# Patient Record
Sex: FEMALE | Race: WHITE | NOT HISPANIC OR LATINO | Employment: UNEMPLOYED | URBAN - METROPOLITAN AREA
[De-identification: names, ages, dates, MRNs, and addresses within clinical notes are randomized per-mention and may not be internally consistent; named-entity substitution may affect disease eponyms.]

---

## 2019-07-29 ENCOUNTER — OFFICE VISIT CONVERTED (OUTPATIENT)
Dept: ORTHOPEDIC SURGERY | Facility: CLINIC | Age: 54
End: 2019-07-29
Attending: ORTHOPAEDIC SURGERY

## 2021-05-15 VITALS — OXYGEN SATURATION: 95 % | HEIGHT: 67 IN | HEART RATE: 78 BPM | BODY MASS INDEX: 28.47 KG/M2 | WEIGHT: 181.37 LBS

## 2023-09-19 ENCOUNTER — TRANSCRIBE ORDERS (OUTPATIENT)
Dept: ADMINISTRATIVE | Facility: HOSPITAL | Age: 58
End: 2023-09-19
Payer: MEDICARE

## 2023-09-19 DIAGNOSIS — Z12.31 ENCOUNTER FOR SCREENING MAMMOGRAM FOR MALIGNANT NEOPLASM OF BREAST: Primary | ICD-10-CM

## 2023-11-28 ENCOUNTER — HOSPITAL ENCOUNTER (EMERGENCY)
Facility: HOSPITAL | Age: 58
Discharge: HOME | End: 2023-11-28
Attending: EMERGENCY MEDICINE | Admitting: EMERGENCY MEDICINE
Payer: MEDICARE

## 2023-11-28 VITALS
SYSTOLIC BLOOD PRESSURE: 170 MMHG | HEART RATE: 80 BPM | TEMPERATURE: 97.7 F | DIASTOLIC BLOOD PRESSURE: 100 MMHG | BODY MASS INDEX: 28.41 KG/M2 | OXYGEN SATURATION: 100 % | HEIGHT: 67 IN | RESPIRATION RATE: 18 BRPM

## 2023-11-28 DIAGNOSIS — K08.89 PAIN, DENTAL: Primary | ICD-10-CM

## 2023-11-28 DIAGNOSIS — K04.7 DENTAL ABSCESS: ICD-10-CM

## 2023-11-28 PROCEDURE — 99283 EMERGENCY DEPT VISIT LOW MDM: CPT

## 2023-11-28 RX ORDER — OXYCODONE HYDROCHLORIDE AND ACETAMINOPHEN 5; 325 MG/1; MG/1
1 TABLET ORAL ONCE
Status: COMPLETED | OUTPATIENT
Start: 2023-11-28 | End: 2023-11-28

## 2023-11-28 RX ORDER — AMOXICILLIN 875 MG/1
875 TABLET, COATED ORAL 2 TIMES DAILY
Qty: 14 TABLET | Refills: 0 | Status: SHIPPED | OUTPATIENT
Start: 2023-11-28 | End: 2023-12-05

## 2023-11-28 RX ORDER — OXYCODONE HYDROCHLORIDE AND ACETAMINOPHEN 5; 325 MG/1; MG/1
1 TABLET ORAL EVERY 6 HOURS PRN
Qty: 12 TABLET | Refills: 0 | Status: SHIPPED | OUTPATIENT
Start: 2023-11-28

## 2023-11-28 RX ORDER — AMOXICILLIN 875 MG/1
875 TABLET, COATED ORAL ONCE
Status: COMPLETED | OUTPATIENT
Start: 2023-11-28 | End: 2023-11-28

## 2023-11-28 RX ADMIN — OXYCODONE AND ACETAMINOPHEN 1 TABLET: 5; 325 TABLET ORAL at 05:50

## 2023-11-28 RX ADMIN — AMOXICILLIN 875 MG: 875 TABLET, FILM COATED ORAL at 05:50

## 2023-11-28 NOTE — DISCHARGE INSTRUCTIONS
Rest, drink plenty of fluids.  Take your meds as prescribed.  Complete the antibiotics.  You may also take over-the-counter Motrin with this medication as needed for pain.  Follow-up with your [facility] dental for further evaluation and treatment or you may follow-up with [facility] dental school if you choose.  Return to the emergency department for any acutely developing facial redness, increased facial swelling, any airway difficulties or any new or worse concerns.

## 2023-11-28 NOTE — ED PROVIDER NOTES
Time: 5:25 AM EST  Date of encounter:  11/28/2023  Independent Historian/Clinical History and Information was obtained by:   Patient    History is limited by: N/A    Chief Complaint: DENTAL PAIN/SWELLING      History of Present Illness:      The patient presents to the emergency department complaining of right lower dental pain and swelling that she states started about 4 days ago.  She states that she went to the dentist at [facility] about 1 month ago and had her upper teeth pulled and a denture made.  She states that she was supposed to go back for her lower extractions and denture made states that she developed swelling 4 days ago to her right lower jaw.  She states that she called them and that they called in some erythromycin she has been taking it ever since.  She states that she has had swelling that is persisted.  She denies any fevers.  She is able to open and close her mouth without difficulties.      History provided by:  Patient   used: No        Patient Care Team  Primary Care Provider: Provider, No Known    Past Medical History:     Allergies   Allergen Reactions    Penicillins Unknown - Low Severity     Past Medical History:   Diagnosis Date    Asthma      Past Surgical History:   Procedure Laterality Date    HYSTERECTOMY       History reviewed. No pertinent family history.    Home Medications:  Prior to Admission medications    Not on File        Social History:   Social History     Tobacco Use    Smoking status: Every Day     Packs/day: .5     Types: Cigarettes    Smokeless tobacco: Never   Vaping Use    Vaping Use: Never used   Substance Use Topics    Alcohol use: Yes     Comment: socially    Drug use: Never         Review of Systems:  Review of Systems   Constitutional:  Negative for chills and fever.   HENT:  Positive for dental problem and facial swelling. Negative for congestion, ear pain and sore throat.    Eyes:  Negative for pain.   Respiratory:  Negative for  "cough, chest tightness and shortness of breath.    Cardiovascular:  Negative for chest pain.   Gastrointestinal:  Negative for abdominal pain, diarrhea, nausea and vomiting.   Genitourinary:  Negative for flank pain and hematuria.   Musculoskeletal:  Negative for back pain, joint swelling, neck pain and neck stiffness.   Skin:  Negative for color change, pallor and rash.   Neurological:  Negative for seizures and headaches.   All other systems reviewed and are negative.       Physical Exam:  /100 (BP Location: Left arm, Patient Position: Sitting)   Pulse 80   Temp 97.7 °F (36.5 °C) (Oral)   Resp 18   Ht 170.2 cm (67\")   LMP  (LMP Unknown)   SpO2 100%   BMI 28.41 kg/m²     Physical Exam  Vitals and nursing note reviewed.   Constitutional:       General: She is not in acute distress.     Appearance: Normal appearance. She is not ill-appearing or toxic-appearing.   HENT:      Head: Normocephalic and atraumatic.      Mouth/Throat:      Mouth: Mucous membranes are moist.      Pharynx: Oropharynx is clear.   Eyes:      General: No scleral icterus.     Conjunctiva/sclera: Conjunctivae normal.      Pupils: Pupils are equal, round, and reactive to light.   Cardiovascular:      Rate and Rhythm: Normal rate and regular rhythm.      Pulses: Normal pulses.   Pulmonary:      Effort: Pulmonary effort is normal. No respiratory distress.   Musculoskeletal:         General: Normal range of motion.      Cervical back: Normal range of motion and neck supple. No rigidity or tenderness.   Lymphadenopathy:      Cervical: No cervical adenopathy.   Skin:     General: Skin is warm and dry.      Capillary Refill: Capillary refill takes less than 2 seconds.      Findings: No rash.   Neurological:      General: No focal deficit present.      Mental Status: She is alert and oriented to person, place, and time. Mental status is at baseline.   Psychiatric:         Mood and Affect: Mood normal.         Behavior: Behavior normal.      "           Procedures:  Procedures      Medical Decision Making:      Comorbidities that affect care:    Asthma    External Notes reviewed:    None      The following orders were placed and all results were independently analyzed by me:  No orders of the defined types were placed in this encounter.      Medications Given in the Emergency Department:  Medications   oxyCODONE-acetaminophen (PERCOCET) 5-325 MG per tablet 1 tablet (1 tablet Oral Given 11/28/23 0550)   amoxicillin (AMOXIL) tablet 875 mg (875 mg Oral Given 11/28/23 0550)        ED Course:         Labs:    Lab Results (last 24 hours)       ** No results found for the last 24 hours. **             Imaging:    No Radiology Exams Resulted Within Past 24 Hours      Differential Diagnosis and Discussion:    Dental Pain: Differential diagnosis includes but is not limited to dental caries, periodontitis, pericoronitis, peridental abscess, gingival abscess, apthous stomatitis, allergic stomatitis, acute necrotizing ulcerative gingivitis, herpetic stomatitis.  Facial Pain/Swelling: Differential diagnosis includes but is not limited to temporal arteritis, intracranial tumors, neuralgias, dental disease, ocular disease, TMJ syndrome, salivary gland disorders, sinusitis, cluster headaches, migraines, and psychogenic.      MDM  Number of Diagnoses or Management Options  Dental abscess: new and requires workup  Pain, dental: new and requires workup  Risk of Complications, Morbidity, and/or Mortality  Presenting problems: low  Diagnostic procedures: low  Management options: low    Patient Progress  Patient progress: stable           Patient Care Considerations:    LABS: I considered ordering labs, however in the absence of any fever and the patient stable condition I did not feel was warranted.      Consultants/Shared Management Plan:    None    Social Determinants of Health:    Patient is independent, reliable, and has access to care.       Disposition and Care  Coordination:    Discharged: The patient is suitable and stable for discharge with no need for consideration of observation or admission.    I have explained the patient´s condition, diagnoses and treatment plan based on the information available to me at this time. I have answered questions and addressed any concerns. The patient has a good  understanding of the patient´s diagnosis, condition, and treatment plan as can be expected at this point. The vital signs have been stable. The patient´s condition is stable and appropriate for discharge from the emergency department.      The patient will pursue further outpatient evaluation with the primary care physician or other designated or consulting physician as outlined in the discharge instructions. They are agreeable to this plan of care and follow-up instructions have been explained in detail. The patient has received these instructions in written format and have expressed an understanding of the discharge instructions. The patient is aware that any significant change in condition or worsening of symptoms should prompt an immediate return to this or the closest emergency department or call to 911.  I have explained discharge medications and the need for follow up with the patient/caretakers. This was also printed in the discharge instructions. Patient was discharged with the following medications and follow up:      Medication List        New Prescriptions      amoxicillin 875 MG tablet  Commonly known as: AMOXIL  Take 1 tablet by mouth 2 (Two) Times a Day for 7 days.     oxyCODONE-acetaminophen 5-325 MG per tablet  Commonly known as: PERCOCET  Take 1 tablet by mouth Every 6 (Six) Hours As Needed for Moderate Pain for up to 12 doses.               Where to Get Your Medications        These medications were sent to [id number] - [place], [place] - [address] AT [facility] [address] - [phone] [facility] [phone] FX  [address],  [place] [address]      Phone: [phone]   amoxicillin 875 MG tablet  oxyCODONE-acetaminophen 5-325 MG per tablet      [facility]  [address] [place] [address]  [phone]  Call   FOR FOLLOW UP       Final diagnoses:   Pain, dental   Dental abscess        ED Disposition       ED Disposition   Discharge    Condition   Stable    Comment   --               This medical record created using voice recognition software.             Diana Solano, JEFFY  11/28/23 0614

## 2024-10-07 ENCOUNTER — TELEPHONE (OUTPATIENT)
Dept: FAMILY MEDICINE CLINIC | Facility: CLINIC | Age: 59
End: 2024-10-07

## 2024-10-07 ENCOUNTER — OFFICE VISIT (OUTPATIENT)
Dept: FAMILY MEDICINE CLINIC | Facility: CLINIC | Age: 59
End: 2024-10-07
Payer: MEDICARE

## 2024-10-07 VITALS
OXYGEN SATURATION: 100 % | DIASTOLIC BLOOD PRESSURE: 72 MMHG | HEIGHT: 67 IN | BODY MASS INDEX: 25.8 KG/M2 | SYSTOLIC BLOOD PRESSURE: 141 MMHG | HEART RATE: 67 BPM | WEIGHT: 164.4 LBS

## 2024-10-07 DIAGNOSIS — M79.642 BILATERAL HAND PAIN: Primary | ICD-10-CM

## 2024-10-07 DIAGNOSIS — Z13.220 SCREENING CHOLESTEROL LEVEL: ICD-10-CM

## 2024-10-07 DIAGNOSIS — Z00.00 ENCOUNTER FOR MEDICAL EXAMINATION TO ESTABLISH CARE: ICD-10-CM

## 2024-10-07 DIAGNOSIS — Z11.59 ENCOUNTER FOR HEPATITIS C SCREENING TEST FOR LOW RISK PATIENT: ICD-10-CM

## 2024-10-07 DIAGNOSIS — M79.641 BILATERAL HAND PAIN: Primary | ICD-10-CM

## 2024-10-07 RX ORDER — MELOXICAM 7.5 MG/1
7.5 TABLET ORAL DAILY
Qty: 30 TABLET | Refills: 0 | Status: SHIPPED | OUTPATIENT
Start: 2024-10-07

## 2024-10-07 NOTE — TELEPHONE ENCOUNTER
Hub staff attempted to follow warm transfer process and was unsuccessful     Caller: DULCE WRIGHT    Relationship to patient: Emergency Contact    Best call back number: [phone]     Patient is needing: CALLER STATED: RETURNING CALL AND REQUESTING A CALL BACK
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
## 2024-11-06 ENCOUNTER — LAB (OUTPATIENT)
Dept: LAB | Facility: HOSPITAL | Age: 59
End: 2024-11-06
Payer: MEDICARE

## 2024-11-06 ENCOUNTER — TELEPHONE (OUTPATIENT)
Dept: FAMILY MEDICINE CLINIC | Facility: CLINIC | Age: 59
End: 2024-11-06
Payer: MEDICARE

## 2024-11-06 DIAGNOSIS — M79.642 BILATERAL HAND PAIN: ICD-10-CM

## 2024-11-06 DIAGNOSIS — Z13.220 SCREENING CHOLESTEROL LEVEL: ICD-10-CM

## 2024-11-06 DIAGNOSIS — Z11.59 ENCOUNTER FOR HEPATITIS C SCREENING TEST FOR LOW RISK PATIENT: ICD-10-CM

## 2024-11-06 DIAGNOSIS — M79.641 BILATERAL HAND PAIN: ICD-10-CM

## 2024-11-06 LAB
ALBUMIN SERPL-MCNC: 4.1 G/DL (ref 3.5–5.2)
ALBUMIN/GLOB SERPL: 1.7 G/DL
ALP SERPL-CCNC: 47 U/L (ref 39–117)
ALT SERPL W P-5'-P-CCNC: 20 U/L (ref 1–33)
ANION GAP SERPL CALCULATED.3IONS-SCNC: 7 MMOL/L (ref 5–15)
AST SERPL-CCNC: 24 U/L (ref 1–32)
BILIRUB SERPL-MCNC: <0.2 MG/DL (ref 0–1.2)
BUN SERPL-MCNC: 12 MG/DL (ref 8–23)
BUN/CREAT SERPL: 11.4 (ref 7–25)
CALCIUM SPEC-SCNC: 9.5 MG/DL (ref 8.6–10.5)
CHLORIDE SERPL-SCNC: 105 MMOL/L (ref 98–107)
CHOLEST SERPL-MCNC: 178 MG/DL (ref 0–200)
CHROMATIN AB SERPL-ACNC: <10 IU/ML (ref 0–14)
CO2 SERPL-SCNC: 31 MMOL/L (ref 22–29)
CREAT SERPL-MCNC: 1.05 MG/DL (ref 0.57–1)
CRP SERPL-MCNC: <0.3 MG/DL (ref 0–0.5)
DEPRECATED RDW RBC AUTO: 38.1 FL (ref 37–54)
EGFRCR SERPLBLD CKD-EPI 2021: 61 ML/MIN/1.73
ERYTHROCYTE [DISTWIDTH] IN BLOOD BY AUTOMATED COUNT: 12.6 % (ref 12.3–15.4)
ERYTHROCYTE [SEDIMENTATION RATE] IN BLOOD: 3 MM/HR (ref 0–30)
GLOBULIN UR ELPH-MCNC: 2.4 GM/DL
GLUCOSE SERPL-MCNC: 83 MG/DL (ref 65–99)
HCT VFR BLD AUTO: 39 % (ref 34–46.6)
HCV AB SER QL: NORMAL
HDLC SERPL-MCNC: 32 MG/DL (ref 40–60)
HGB BLD-MCNC: 12.3 G/DL (ref 12–15.9)
LDLC SERPL CALC-MCNC: 117 MG/DL (ref 0–100)
LDLC/HDLC SERPL: 3.55 {RATIO}
MCH RBC QN AUTO: 26.6 PG (ref 26.6–33)
MCHC RBC AUTO-ENTMCNC: 31.5 G/DL (ref 31.5–35.7)
MCV RBC AUTO: 84.2 FL (ref 79–97)
PLATELET # BLD AUTO: 250 10*3/MM3 (ref 140–450)
PMV BLD AUTO: 13.3 FL (ref 6–12)
POTASSIUM SERPL-SCNC: 4.4 MMOL/L (ref 3.5–5.2)
PROT SERPL-MCNC: 6.5 G/DL (ref 6–8.5)
RBC # BLD AUTO: 4.63 10*6/MM3 (ref 3.77–5.28)
SODIUM SERPL-SCNC: 143 MMOL/L (ref 136–145)
TRIGL SERPL-MCNC: 162 MG/DL (ref 0–150)
VLDLC SERPL-MCNC: 29 MG/DL (ref 5–40)
WBC NRBC COR # BLD AUTO: 6.25 10*3/MM3 (ref 3.4–10.8)

## 2024-11-06 PROCEDURE — 80053 COMPREHEN METABOLIC PANEL: CPT

## 2024-11-06 PROCEDURE — 86431 RHEUMATOID FACTOR QUANT: CPT

## 2024-11-06 PROCEDURE — 80061 LIPID PANEL: CPT

## 2024-11-06 PROCEDURE — 36415 COLL VENOUS BLD VENIPUNCTURE: CPT

## 2024-11-06 PROCEDURE — 86803 HEPATITIS C AB TEST: CPT

## 2024-11-06 PROCEDURE — 85027 COMPLETE CBC AUTOMATED: CPT

## 2024-11-06 PROCEDURE — 85652 RBC SED RATE AUTOMATED: CPT

## 2024-11-06 PROCEDURE — 86140 C-REACTIVE PROTEIN: CPT

## 2024-11-06 NOTE — TELEPHONE ENCOUNTER
Caller: DULCE WRIGHT    Relationship: Emergency Contact    Best call back number: [phone]     What is the best time to reach you: ANY    Who are you requesting to speak with (clinical staff, provider,  specific staff member): CLINICAL STAFF    What was the call regarding: PATIENTS  CALLED STATING THAT THEY LOST THE PAPER WITH THE XRAY INFORMATION AND WOULD LIKE TO KNOW WHO THEY NEED TO CALL TO HAVE IT DONE

## 2024-11-07 ENCOUNTER — OFFICE VISIT (OUTPATIENT)
Dept: FAMILY MEDICINE CLINIC | Facility: CLINIC | Age: 59
End: 2024-11-07
Payer: MEDICARE

## 2024-11-07 VITALS
BODY MASS INDEX: 25.96 KG/M2 | HEART RATE: 80 BPM | DIASTOLIC BLOOD PRESSURE: 89 MMHG | SYSTOLIC BLOOD PRESSURE: 140 MMHG | OXYGEN SATURATION: 96 % | WEIGHT: 165.4 LBS | HEIGHT: 67 IN

## 2024-11-07 DIAGNOSIS — Z87.891 PERSONAL HISTORY OF NICOTINE DEPENDENCE: ICD-10-CM

## 2024-11-07 DIAGNOSIS — Z12.31 ENCOUNTER FOR SCREENING MAMMOGRAM FOR MALIGNANT NEOPLASM OF BREAST: Primary | ICD-10-CM

## 2024-11-07 DIAGNOSIS — G89.29 CHRONIC PAIN OF RIGHT ANKLE: ICD-10-CM

## 2024-11-07 DIAGNOSIS — Z12.11 COLON CANCER SCREENING: ICD-10-CM

## 2024-11-07 DIAGNOSIS — R79.89 ELEVATED SERUM CREATININE: Primary | ICD-10-CM

## 2024-11-07 DIAGNOSIS — M25.571 CHRONIC PAIN OF RIGHT ANKLE: ICD-10-CM

## 2024-11-07 PROCEDURE — 99213 OFFICE O/P EST LOW 20 MIN: CPT | Performed by: STUDENT IN AN ORGANIZED HEALTH CARE EDUCATION/TRAINING PROGRAM

## 2024-11-07 NOTE — TELEPHONE ENCOUNTER
Spoke with patient and informed her of [facility] address and hours of operation. Informed her that she can walk in for XRAY - no appt needed.

## 2024-11-07 NOTE — PROGRESS NOTES
I have reviewed results of lab work.    Total cholesterol is normal.  However LDL cholesterol is elevated at 117.  Based on her overall ASCVD risk score, statin therapy would be indicated to reduce risk of heart attack and stroke.    CMP shows elevation of creatinine at 1.05 although GFR is normal at 61.  Will repeat this lab in 3 months to reassess renal function.    CBC shows normal white count, hemoglobin, and platelets.    Rheumatoid factor, CRP and ESR are normal.  Hepatitis C screen is nonreactive.    She has appointment coming up with me today and so we can discuss these labs at that time.    ?  This document has been electronically signed by Binh Llanes MD on November 7, 2024 12:00 EST

## 2024-11-07 NOTE — PROGRESS NOTES
Subjective:       Tami Parsons is a 59 y.o. female with a concurrent medical history of hyperlipidemia who presents to discuss lab work.    Ms. Parsons a hyperlipidemia on lab work.  Recommendation for her based on risk factors would be to start statin medication.  She declines this today and prefers lifestyle dietary modifications alone.    Ms. Parsons does have persistently elevated blood pressure.  Blood pressure elevated at 140/89.  She is reluctant to start medication.  I would recommend following DASH diet and if she fails to improve at 6-month follow-up would start antihypertensive    Ms. Parsons does have elevated creatinine.  Will repeat this lab in 3 months and encouraged increase consumption of fluid.    Ms. Parsons has chronic ankle pain related to an old injury.  She request referral for physical therapy and will send this today.    The following portions of the patient's history were reviewed and updated as appropriate: allergies, current medications, past family history, past medical history, past social history, past surgical history, and problem list.    Past Medical Hx:  Past Medical History:   Diagnosis Date    Asthma        Past Surgical Hx:  Past Surgical History:   Procedure Laterality Date    HYSTERECTOMY         Current Meds:  No current outpatient medications on file.    Allergies:  Allergies   Allergen Reactions    Penicillins Unknown - Low Severity       Family Hx:  History reviewed. No pertinent family history.     Social History:  Social History     Socioeconomic History    Marital status:    Tobacco Use    Smoking status: Every Day     Current packs/day: 0.50     Average packs/day: 0.5 packs/day for 42.8 years (21.4 ttl pk-yrs)     Types: Cigarettes     Start date: 1982    Smokeless tobacco: Never   Vaping Use    Vaping status: Never Used   Substance and Sexual Activity    Alcohol use: Yes     Comment: socially    Drug use: Never    Sexual activity: Defer       Review of  "Systems  Review of Systems   Musculoskeletal:  Positive for arthralgias.       Objective:     /89 (BP Location: Left arm, Patient Position: Sitting, Cuff Size: Large Adult)   Pulse 80   Ht 170.2 cm (67\")   Wt 75 kg (165 lb 6.4 oz)   SpO2 96%   BMI 25.91 kg/m²   Physical Exam  Constitutional:       General: She is not in acute distress.     Appearance: Normal appearance. She is not ill-appearing, toxic-appearing or diaphoretic.   Cardiovascular:      Rate and Rhythm: Normal rate and regular rhythm.   Pulmonary:      Effort: Pulmonary effort is normal.      Breath sounds: Normal breath sounds.   Neurological:      Mental Status: She is alert.          Assessment/Plan:     Diagnoses and all orders for this visit:    1. Encounter for screening mammogram for malignant neoplasm of breast (Primary)  -     Mammo Screening Digital Tomosynthesis Bilateral With CAD; Future    2. Colon cancer screening  -     Cologuard - Stool, Per Rectum; Future    3. Personal history of nicotine dependence  -     CT Chest Low Dose Wo; Future    4. Chronic pain of right ankle    Ms. Parsons has chronic ankle pain related to an old injury.  She request referral for physical therapy and will send this today.    -     Ambulatory Referral to Physical Therapy for Evaluation & Treatment                Follow-up:     Return in about 6 months (around 5/7/2025) for Blood pressure recheck.    Preventative:  Health Maintenance   Topic Date Due    MAMMOGRAM  Never done    COLORECTAL CANCER SCREENING  Never done    ZOSTER VACCINE (1 of 2) Never done    LUNG CANCER SCREENING  Never done    ANNUAL WELLNESS VISIT  Never done    COVID-19 Vaccine (4 - 2024-25 season) 09/01/2024    INFLUENZA VACCINE  03/31/2025 (Originally 8/1/2024)    Pneumococcal Vaccine 0-64 (1 of 2 - PCV) 10/07/2025 (Originally [birth year]/28/1970)    TDAP/TD VACCINES (1 - Tdap) 10/07/2025 (Originally 3/28/1983)    BMI FOLLOWUP  10/07/2025    HEPATITIS C SCREENING  Completed           This " document has been electronically signed by Binh Llanes MD on November 7, 2024 16:35 EST       Parts of this note are electronic transcriptions/translations of spoken language to printed text using the Dragon Dictation system.

## 2024-12-10 ENCOUNTER — HOSPITAL ENCOUNTER (OUTPATIENT)
Dept: GENERAL RADIOLOGY | Facility: HOSPITAL | Age: 59
Discharge: HOME | End: 2024-12-10
Payer: MEDICARE

## 2024-12-10 ENCOUNTER — HOSPITAL ENCOUNTER (OUTPATIENT)
Dept: CT IMAGING | Facility: HOSPITAL | Age: 59
Discharge: HOME | End: 2024-12-10
Payer: MEDICARE

## 2024-12-10 ENCOUNTER — HOSPITAL ENCOUNTER (OUTPATIENT)
Dept: MAMMOGRAPHY | Facility: HOSPITAL | Age: 59
Discharge: HOME | End: 2024-12-10
Payer: MEDICARE

## 2024-12-10 DIAGNOSIS — M79.641 BILATERAL HAND PAIN: ICD-10-CM

## 2024-12-10 DIAGNOSIS — Z12.31 ENCOUNTER FOR SCREENING MAMMOGRAM FOR MALIGNANT NEOPLASM OF BREAST: ICD-10-CM

## 2024-12-10 DIAGNOSIS — M79.642 BILATERAL HAND PAIN: ICD-10-CM

## 2024-12-10 DIAGNOSIS — Z87.891 PERSONAL HISTORY OF NICOTINE DEPENDENCE: ICD-10-CM

## 2024-12-10 PROCEDURE — 73130 X-RAY EXAM OF HAND: CPT

## 2024-12-10 PROCEDURE — 71271 CT THORAX LUNG CANCER SCR C-: CPT

## 2024-12-10 PROCEDURE — 77063 BREAST TOMOSYNTHESIS BI: CPT

## 2024-12-10 PROCEDURE — 77067 SCR MAMMO BI INCL CAD: CPT

## 2024-12-10 NOTE — PROGRESS NOTES
Negative mammogram.    Thank you,    Binh Llanse    ?  This document has been electronically signed by Binh Llanes MD on December 10, 2024 15:46 EST

## 2024-12-12 ENCOUNTER — TELEPHONE (OUTPATIENT)
Dept: FAMILY MEDICINE CLINIC | Facility: CLINIC | Age: 59
End: 2024-12-12
Payer: MEDICARE

## 2024-12-12 DIAGNOSIS — J38.7: ICD-10-CM

## 2024-12-12 DIAGNOSIS — J39.8 TRACHEAL NODULE: Primary | ICD-10-CM

## 2024-12-16 ENCOUNTER — TREATMENT (OUTPATIENT)
Dept: PHYSICAL THERAPY | Facility: CLINIC | Age: 59
End: 2024-12-16
Payer: MEDICARE

## 2024-12-16 DIAGNOSIS — R29.898 WEAKNESS OF FOOT, RIGHT: ICD-10-CM

## 2024-12-16 DIAGNOSIS — Z87.81 HISTORY OF FRACTURE OF RIGHT ANKLE: ICD-10-CM

## 2024-12-16 DIAGNOSIS — M25.571 CHRONIC PAIN OF RIGHT ANKLE: Primary | ICD-10-CM

## 2024-12-16 DIAGNOSIS — G89.29 CHRONIC PAIN OF RIGHT ANKLE: Primary | ICD-10-CM

## 2024-12-16 PROCEDURE — 97161 PT EVAL LOW COMPLEX 20 MIN: CPT

## 2024-12-16 PROCEDURE — 97110 THERAPEUTIC EXERCISES: CPT

## 2024-12-16 PROCEDURE — 97112 NEUROMUSCULAR REEDUCATION: CPT
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
## 2024-12-19 ENCOUNTER — TREATMENT (OUTPATIENT)
Dept: PHYSICAL THERAPY | Facility: CLINIC | Age: 59
End: 2024-12-19
Payer: MEDICARE

## 2024-12-19 DIAGNOSIS — M25.571 CHRONIC PAIN OF RIGHT ANKLE: Primary | ICD-10-CM

## 2024-12-19 DIAGNOSIS — G89.29 CHRONIC PAIN OF RIGHT ANKLE: Primary | ICD-10-CM

## 2024-12-19 DIAGNOSIS — Z87.81 HISTORY OF FRACTURE OF RIGHT ANKLE: ICD-10-CM

## 2024-12-19 DIAGNOSIS — R29.898 WEAKNESS OF FOOT, RIGHT: ICD-10-CM
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
## 2024-12-30 ENCOUNTER — TREATMENT (OUTPATIENT)
Dept: PHYSICAL THERAPY | Facility: CLINIC | Age: 59
End: 2024-12-30
Payer: MEDICARE

## 2024-12-30 DIAGNOSIS — G89.29 CHRONIC PAIN OF RIGHT ANKLE: Primary | ICD-10-CM

## 2024-12-30 DIAGNOSIS — M25.571 CHRONIC PAIN OF RIGHT ANKLE: Primary | ICD-10-CM

## 2024-12-30 DIAGNOSIS — R29.898 WEAKNESS OF FOOT, RIGHT: ICD-10-CM

## 2024-12-30 DIAGNOSIS — Z87.81 HISTORY OF FRACTURE OF RIGHT ANKLE: ICD-10-CM

## 2024-12-30 PROCEDURE — 97140 MANUAL THERAPY 1/> REGIONS: CPT

## 2024-12-30 PROCEDURE — 97110 THERAPEUTIC EXERCISES: CPT

## 2024-12-30 NOTE — PROGRESS NOTES
Physical Therapy Daily Treatment Note                          Patient: Tami Parsons   : 1965  Diagnosis/ICD-10 Code:  Chronic pain of right ankle [M25.571, G89.29]  Referring practitioner: Binh Llanes MD  Date of Initial Visit: Type: THERAPY  Noted: 2024  Today's Date: 2024  Patient seen for 3 sessions           Subjective   The patient reported feeling that she can move her foot side to side better.     Objective   See Exercise, Manual, and Modality Logs for complete treatment.     Assessment/Plan     Patient tolerated today's treatment without complaints of pain. Improvements noted in ankle ROM following manual, able to introduce low level stabilization with good tolerance. Strength, ROM, and increased pain with activity deficits still limits patient's ability to perform safe ambulation. Further skilled care indicated at this time.       Timed:  Manual Therapy:    15     mins  05237;  Therapeutic Exercise:    15     mins  74977;     Neuromuscular Ronnie:   0    mins  69900;    Therapeutic Activity:     0     mins  80802;     Gait Trainin     mins  97132;     Aquatics                         0      mins  71423    Un-timed:  Mechanical Traction      0     mins  57470  Dry Needling     0     mins self-pay  Electrical Stimulation:    0     mins  60415 ( );      Timed Treatment:   30   mins   Total Treatment:     30   mins    Dejuan Muro PT  Physical Therapist    Electronically signed 2024    [place] License: PT - [id number]

## 2025-01-03 ENCOUNTER — TREATMENT (OUTPATIENT)
Dept: PHYSICAL THERAPY | Facility: CLINIC | Age: 60
End: 2025-01-03
Payer: MEDICARE

## 2025-01-03 DIAGNOSIS — Z87.81 HISTORY OF FRACTURE OF RIGHT ANKLE: ICD-10-CM

## 2025-01-03 DIAGNOSIS — R29.898 WEAKNESS OF FOOT, RIGHT: ICD-10-CM

## 2025-01-03 DIAGNOSIS — G89.29 CHRONIC PAIN OF RIGHT ANKLE: Primary | ICD-10-CM

## 2025-01-03 DIAGNOSIS — M25.571 CHRONIC PAIN OF RIGHT ANKLE: Primary | ICD-10-CM

## 2025-01-03 NOTE — PROGRESS NOTES
[facility]                              Physical Therapy Daily Treatment Note    Patient: Tami Parsons   : 1965  Diagnosis/ICD-10 Code:  Chronic pain of right ankle [M25.571, G89.29]  Referring practitioner: Binh Llanes MD  Date of Initial Visit: Type: THERAPY  Noted: 2024  Today's Date: 1/3/2025  Patient seen for 4 sessions           Subjective   Tami Parsons reports: her right ankle is feeling good today. Pt denies any pain at time of arrival.      Objective     See Exercise, Manual, and Modality Logs for complete treatment.     Assessment/Plan  Tami tolerated today's treatment well with no complaints of discomfort or pain. During new stabilization exercises patient reports her ankle felt like it was working, but never hurt.  Patient will continue to benefit from skilled physical therapy services to further address pain management,  their deficits in strength, and range of motion in order to improve upon their functional mobility for any ADL concerns.      Progress per Plan of Care         Timed:  Manual Therapy:         mins  54271;  Therapeutic Exercise:         mins  57541;     Neuromuscular Ronnie:    12    mins  74552;    Therapeutic Activity:     15     mins  36350;     Gait Training:           mins  80729;        Untimed:  Electrical Stimulation:         mins  45482 ( );  Mechanical Traction:         mins  48213;       Timed Treatment:   27   mins   Total Treatment:     27   mins      Electronically signed:     Tiki Wright PTA  Physical Therapist Assistant  [facility] License #: [id number]

## 2025-01-13 ENCOUNTER — TREATMENT (OUTPATIENT)
Dept: PHYSICAL THERAPY | Facility: CLINIC | Age: 60
End: 2025-01-13
Payer: MEDICARE

## 2025-01-13 DIAGNOSIS — G89.29 CHRONIC PAIN OF RIGHT ANKLE: Primary | ICD-10-CM

## 2025-01-13 DIAGNOSIS — Z87.81 HISTORY OF FRACTURE OF RIGHT ANKLE: ICD-10-CM

## 2025-01-13 DIAGNOSIS — M25.571 CHRONIC PAIN OF RIGHT ANKLE: Primary | ICD-10-CM

## 2025-01-13 DIAGNOSIS — R29.898 WEAKNESS OF FOOT, RIGHT: ICD-10-CM

## 2025-01-13 PROCEDURE — 97112 NEUROMUSCULAR REEDUCATION: CPT

## 2025-01-13 PROCEDURE — 97110 THERAPEUTIC EXERCISES: CPT

## 2025-01-13 NOTE — PROGRESS NOTES
[facility]                              Physical Therapy Daily Treatment Note    Patient: Tami Parsons   : 1965  Diagnosis/ICD-10 Code:  Chronic pain of right ankle [M25.571, G89.29]  Referring practitioner: Binh Llanes MD  Date of Initial Visit: Type: THERAPY  Noted: 2024  Today's Date: 2025  Patient seen for 5 sessions           Subjective   Tami Parsons reports: her ankle has been feeling really good. Pt denies any pain at time of arrival.      Objective     See Exercise, Manual, and Modality Logs for complete treatment.     Assessment/Plan  Tami progressing as evident by decreased overall right ankle pain. Pt tolerated exercises well, with no complaints of increased pain or discomfort. Patients ankle mobility has improved since initial eval, patient noticed the improvement herself and reported that to PTA.  Patient will continue to benefit from skilled physical therapy services to further address  their deficits in strength, stability and range of motion in order to improve upon their functional mobility for any ADL concerns.      Progress per Plan of Care         Timed:  Manual Therapy:         mins  65659;  Therapeutic Exercise:    15     mins  34111;     Neuromuscular Ronnie:    10    mins  32594;    Therapeutic Activity:          mins  54118;     Gait Training:           mins  70759;        Untimed:  Electrical Stimulation:         mins  44254 ( );  Mechanical Traction:         mins  12519;       Timed Treatment:   25   mins   Total Treatment:     25   mins      Electronically signed:     Tiki Wright PTA  Physical Therapist Assistant  [facility] License #: [id number]
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
## 2025-01-17 ENCOUNTER — TELEPHONE (OUTPATIENT)
Dept: PULMONOLOGY | Facility: CLINIC | Age: 60
End: 2025-01-17

## 2025-01-17 ENCOUNTER — OFFICE VISIT (OUTPATIENT)
Dept: PULMONOLOGY | Facility: CLINIC | Age: 60
End: 2025-01-17
Payer: MEDICARE

## 2025-01-17 VITALS
WEIGHT: 169.6 LBS | DIASTOLIC BLOOD PRESSURE: 85 MMHG | BODY MASS INDEX: 26.62 KG/M2 | HEIGHT: 67 IN | TEMPERATURE: 97.9 F | OXYGEN SATURATION: 97 % | RESPIRATION RATE: 18 BRPM | SYSTOLIC BLOOD PRESSURE: 137 MMHG | HEART RATE: 78 BPM

## 2025-01-17 DIAGNOSIS — R93.89 ABNORMAL CHEST CT: Primary | ICD-10-CM

## 2025-01-17 DIAGNOSIS — Z72.0 TOBACCO ABUSE: ICD-10-CM

## 2025-01-17 DIAGNOSIS — Z71.6 ENCOUNTER FOR SMOKING CESSATION COUNSELING: ICD-10-CM

## 2025-01-17 PROCEDURE — 1159F MED LIST DOCD IN RCRD: CPT

## 2025-01-17 PROCEDURE — 1160F RVW MEDS BY RX/DR IN RCRD: CPT

## 2025-01-17 PROCEDURE — 99204 OFFICE O/P NEW MOD 45 MIN: CPT

## 2025-01-17 NOTE — TELEPHONE ENCOUNTER
Spoke with Nydia mejía to get patient scheduled for 1/24 arrive at 10:30am. Patient received instruction sheet at check out.

## 2025-01-23 ENCOUNTER — ANESTHESIA EVENT (OUTPATIENT)
Dept: GASTROENTEROLOGY | Facility: HOSPITAL | Age: 60
End: 2025-01-23
Payer: MEDICARE

## 2025-01-23 RX ORDER — SODIUM CHLORIDE, SODIUM LACTATE, POTASSIUM CHLORIDE, CALCIUM CHLORIDE 600; 310; 30; 20 MG/100ML; MG/100ML; MG/100ML; MG/100ML
30 INJECTION, SOLUTION INTRAVENOUS CONTINUOUS
Status: CANCELLED | OUTPATIENT
Start: 2025-01-24 | End: 2025-01-24
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
## 2025-01-24 ENCOUNTER — ANESTHESIA (OUTPATIENT)
Dept: GASTROENTEROLOGY | Facility: HOSPITAL | Age: 60
End: 2025-01-24
Payer: MEDICARE

## 2025-01-24 ENCOUNTER — HOSPITAL ENCOUNTER (OUTPATIENT)
Facility: HOSPITAL | Age: 60
Setting detail: HOSPITAL OUTPATIENT SURGERY
Discharge: HOME | End: 2025-01-24
Attending: STUDENT IN AN ORGANIZED HEALTH CARE EDUCATION/TRAINING PROGRAM | Admitting: STUDENT IN AN ORGANIZED HEALTH CARE EDUCATION/TRAINING PROGRAM
Payer: MEDICARE

## 2025-01-24 VITALS
DIASTOLIC BLOOD PRESSURE: 89 MMHG | TEMPERATURE: 99.1 F | OXYGEN SATURATION: 96 % | RESPIRATION RATE: 18 BRPM | WEIGHT: 163.36 LBS | SYSTOLIC BLOOD PRESSURE: 119 MMHG | HEART RATE: 84 BPM | BODY MASS INDEX: 25.59 KG/M2

## 2025-01-24 DIAGNOSIS — R93.89 ABNORMAL CHEST CT: ICD-10-CM

## 2025-01-24 LAB
ACB CMPLX DNA BAL NAA+NON-PRB-NCNCRNG: NOT DETECTED
BLACTX-M ISLT/SPM QL: ABNORMAL
BLAIMP ISLT/SPM QL: ABNORMAL
BLAKPC ISLT/SPM QL: ABNORMAL
BLAOXA-48-LIKE ISLT/SPM QL: ABNORMAL
BLAVIM ISLT/SPM QL: ABNORMAL
C PNEUM DNA NPH QL NAA+NON-PROBE: NOT DETECTED
CILIATED BAL QL: 73 %
E CLOAC COMP DNA BAL NAA+NON-PRB-NCNCRNG: NOT DETECTED
E COLI DNA BAL NAA+NON-PRB-NCNCRNG: NOT DETECTED
FLUAV SUBTYP SPEC NAA+PROBE: NOT DETECTED
FLUBV RNA ISLT QL NAA+PROBE: NOT DETECTED
GP B STREP DNA BAL NAA+NON-PRB-NCNCRNG: NOT DETECTED
HADV DNA SPEC NAA+PROBE: NOT DETECTED
HAEM INFLU DNA BAL NAA+NON-PRB-NCNCRNG: DETECTED
HCOV RNA LOWER RESP QL NAA+NON-PROBE: NOT DETECTED
HMPV RNA NPH QL NAA+NON-PROBE: NOT DETECTED
HPIV RNA LOWER RESP QL NAA+NON-PROBE: NOT DETECTED
K AEROGENES DNA BAL NAA+NON-PRB-NCNCRNG: NOT DETECTED
K OXYTOCA DNA BAL NAA+NON-PRB-NCNCRNG: NOT DETECTED
K PNEU GRP DNA BAL NAA+NON-PRB-NCNCRNG: NOT DETECTED
L PNEUMO DNA LOWER RESP QL NAA+NON-PROBE: NOT DETECTED
LYMPHOCYTES NFR FLD MANUAL: 3 %
M CATARRHALIS DNA BAL NAA+NON-PRB-NCNCRNG: NOT DETECTED
M PNEUMO IGG SER IA-ACNC: NOT DETECTED
MACROPHAGE FLUID %: 19 %
MECA+MECC ISLT/SPM QL: ABNORMAL
NDM GENE: ABNORMAL
NEUTROPHILS NFR FLD MANUAL: 5 %
P AERUGINOSA DNA BAL NAA+NON-PRB-NCNCRNG: NOT DETECTED
PROTEUS SP DNA BAL NAA+NON-PRB-NCNCRNG: NOT DETECTED
RHINOVIRUS RNA SPEC NAA+PROBE: NOT DETECTED
RSV RNA NPH QL NAA+NON-PROBE: NOT DETECTED
S AUREUS DNA BAL NAA+NON-PRB-NCNCRNG: NOT DETECTED
S MARCESCENS DNA BAL NAA+NON-PRB-NCNCRNG: NOT DETECTED
S PNEUM DNA BAL NAA+NON-PRB-NCNCRNG: NOT DETECTED
S PYO DNA BAL NAA+NON-PRB-NCNCRNG: NOT DETECTED
VISUAL PRESENCE OF BLOOD: NORMAL

## 2025-01-24 PROCEDURE — 87633 RESP VIRUS 12-25 TARGETS: CPT | Performed by: STUDENT IN AN ORGANIZED HEALTH CARE EDUCATION/TRAINING PROGRAM

## 2025-01-24 PROCEDURE — 87116 MYCOBACTERIA CULTURE: CPT | Performed by: STUDENT IN AN ORGANIZED HEALTH CARE EDUCATION/TRAINING PROGRAM

## 2025-01-24 PROCEDURE — 87496 CYTOMEG DNA AMP PROBE: CPT | Performed by: STUDENT IN AN ORGANIZED HEALTH CARE EDUCATION/TRAINING PROGRAM

## 2025-01-24 PROCEDURE — 25010000002 LIDOCAINE PF 2% 2 % SOLUTION: Performed by: NURSE ANESTHETIST, CERTIFIED REGISTERED

## 2025-01-24 PROCEDURE — 87070 CULTURE OTHR SPECIMN AEROBIC: CPT | Performed by: STUDENT IN AN ORGANIZED HEALTH CARE EDUCATION/TRAINING PROGRAM

## 2025-01-24 PROCEDURE — 87071 CULTURE AEROBIC QUANT OTHER: CPT | Performed by: STUDENT IN AN ORGANIZED HEALTH CARE EDUCATION/TRAINING PROGRAM

## 2025-01-24 PROCEDURE — 25010000002 PROPOFOL 10 MG/ML EMULSION: Performed by: NURSE ANESTHETIST, CERTIFIED REGISTERED

## 2025-01-24 PROCEDURE — 87205 SMEAR GRAM STAIN: CPT | Performed by: STUDENT IN AN ORGANIZED HEALTH CARE EDUCATION/TRAINING PROGRAM

## 2025-01-24 PROCEDURE — 87102 FUNGUS ISOLATION CULTURE: CPT | Performed by: STUDENT IN AN ORGANIZED HEALTH CARE EDUCATION/TRAINING PROGRAM

## 2025-01-24 PROCEDURE — 89051 BODY FLUID CELL COUNT: CPT | Performed by: STUDENT IN AN ORGANIZED HEALTH CARE EDUCATION/TRAINING PROGRAM

## 2025-01-24 PROCEDURE — 88305 TISSUE EXAM BY PATHOLOGIST: CPT | Performed by: STUDENT IN AN ORGANIZED HEALTH CARE EDUCATION/TRAINING PROGRAM

## 2025-01-24 PROCEDURE — 87252 VIRUS INOCULATION TISSUE: CPT | Performed by: STUDENT IN AN ORGANIZED HEALTH CARE EDUCATION/TRAINING PROGRAM

## 2025-01-24 PROCEDURE — 88108 CYTOPATH CONCENTRATE TECH: CPT | Performed by: STUDENT IN AN ORGANIZED HEALTH CARE EDUCATION/TRAINING PROGRAM

## 2025-01-24 PROCEDURE — 31625 BRONCHOSCOPY W/BIOPSY(S): CPT | Performed by: STUDENT IN AN ORGANIZED HEALTH CARE EDUCATION/TRAINING PROGRAM

## 2025-01-24 PROCEDURE — 31624 DX BRONCHOSCOPE/LAVAGE: CPT | Performed by: STUDENT IN AN ORGANIZED HEALTH CARE EDUCATION/TRAINING PROGRAM

## 2025-01-24 PROCEDURE — 25810000003 LACTATED RINGERS PER 1000 ML: Performed by: NURSE ANESTHETIST, CERTIFIED REGISTERED

## 2025-01-24 PROCEDURE — 87206 SMEAR FLUORESCENT/ACID STAI: CPT | Performed by: STUDENT IN AN ORGANIZED HEALTH CARE EDUCATION/TRAINING PROGRAM

## 2025-01-24 PROCEDURE — 31645 BRNCHSC W/THER ASPIR 1ST: CPT | Performed by: STUDENT IN AN ORGANIZED HEALTH CARE EDUCATION/TRAINING PROGRAM

## 2025-01-24 PROCEDURE — 25010000002 LIDOCAINE HCL (PF) 4 % SOLUTION: Performed by: STUDENT IN AN ORGANIZED HEALTH CARE EDUCATION/TRAINING PROGRAM

## 2025-01-24 RX ORDER — MAGNESIUM HYDROXIDE 1200 MG/15ML
LIQUID ORAL AS NEEDED
Status: DISCONTINUED | OUTPATIENT
Start: 2025-01-24 | End: 2025-01-24 | Stop reason: HOSPADM

## 2025-01-24 RX ORDER — CEFDINIR 300 MG/1
300 CAPSULE ORAL 2 TIMES DAILY
Qty: 10 CAPSULE | Refills: 0 | Status: SHIPPED | OUTPATIENT
Start: 2025-01-24 | End: 2025-01-29

## 2025-01-24 RX ORDER — SODIUM CHLORIDE, SODIUM LACTATE, POTASSIUM CHLORIDE, CALCIUM CHLORIDE 600; 310; 30; 20 MG/100ML; MG/100ML; MG/100ML; MG/100ML
INJECTION, SOLUTION INTRAVENOUS CONTINUOUS PRN
Status: DISCONTINUED | OUTPATIENT
Start: 2025-01-24 | End: 2025-01-24 | Stop reason: SURG

## 2025-01-24 RX ORDER — LIDOCAINE HYDROCHLORIDE 40 MG/ML
INJECTION, SOLUTION RETROBULBAR AS NEEDED
Status: DISCONTINUED | OUTPATIENT
Start: 2025-01-24 | End: 2025-01-24 | Stop reason: HOSPADM

## 2025-01-24 RX ORDER — PROPOFOL 10 MG/ML
VIAL (ML) INTRAVENOUS AS NEEDED
Status: DISCONTINUED | OUTPATIENT
Start: 2025-01-24 | End: 2025-01-24 | Stop reason: SURG

## 2025-01-24 RX ORDER — LIDOCAINE HYDROCHLORIDE 20 MG/ML
INJECTION, SOLUTION EPIDURAL; INFILTRATION; INTRACAUDAL; PERINEURAL AS NEEDED
Status: DISCONTINUED | OUTPATIENT
Start: 2025-01-24 | End: 2025-01-24 | Stop reason: SURG

## 2025-01-24 RX ADMIN — PROPOFOL 70 MG: 10 INJECTION, EMULSION INTRAVENOUS at 14:09

## 2025-01-24 RX ADMIN — LIDOCAINE HYDROCHLORIDE 100 MG: 20 INJECTION, SOLUTION EPIDURAL; INFILTRATION; INTRACAUDAL; PERINEURAL at 14:10

## 2025-01-24 RX ADMIN — PROPOFOL 200 MCG/KG/MIN: 10 INJECTION, EMULSION INTRAVENOUS at 14:10

## 2025-01-24 RX ADMIN — SODIUM CHLORIDE, POTASSIUM CHLORIDE, SODIUM LACTATE AND CALCIUM CHLORIDE: 600; 310; 30; 20 INJECTION, SOLUTION INTRAVENOUS at 13:48

## 2025-01-24 NOTE — OP NOTE
Bronchoscopy Procedure Note    Procedure:  Bronchoscopy with bronchoalveolar lavage   Airway inspection with airway clearance of secretions.     Pre-Operative Diagnosis: Abnormal CT  Post-Operative Diagnosis: Same    Indication:  Abnormal CT, endotracheal lesion seen on CT    Anesthesia: MAC anesthesia    Procedure Details: Patient was consented for the procedure with all risks and benefits of the procedure explained in detail. Patient was given the opportunity to ask questions and all concerns were answered.    The bronchoscope was inserted into the main airway via the mouth. An anatomical survey was done of the main airways and the subsegmental bronchus. The findings are reported below. A bronchoalveolar lavage was performed using 60 mL aliquots of normal saline instilled into the airways then aspirated back from right upper lobe of lung with 30 mL fluid in return. A few 2-3 endotracheal lesions seen on bronchoscopy.  These appear like appendages of tracheal tissue and appears benign.  Biopsies taken.  Scattered secretions seen throughout lung  Airway clearance of lung performed with suctioning and removal of secretions and mucous plugs.     Findings:  Endotracheal lesion/appendage  Friable mucosa, easy bleeding with suction  Scattered petechiae seen airway bilaterally  Scattered purulent secretions    Estimated Blood Loss: <15 mL    Specimens:  BAL RUL  Bronchial washings tracheobronchial tree  Biopsy of endotracheal appendage/lesion    Complications: None; patient tolerated the procedure well.    Disposition: To home    Patient tolerated the procedure well.    Electronically signed by Annie Eid MD, 1/24/2025, 14:30 EST.

## 2025-01-24 NOTE — ANESTHESIA POSTPROCEDURE EVALUATION
Patient: Tami Parsons    Procedure Summary       Date: 01/24/25 Room / Location: [facility] ENDOSCOPY 3 / [facility]    Anesthesia Start: 1347 Anesthesia Stop: 1429    Procedure: BRONCHOSCOPY WITH BAL AND BIOPSIES (Bronchus) Diagnosis:       Abnormal chest CT      (Abnormal chest CT [R93.89])    Surgeons: Annie Eid MD Provider: Mady Eaton CRNA    Anesthesia Type: general ASA Status: 2            Anesthesia Type: general    Vitals  Vitals Value Taken Time   /89 01/24/25 1445   Temp 37.3 °C (99.1 °F) 01/24/25 1445   Pulse 84 01/24/25 1446   Resp 18 01/24/25 1445   SpO2 96 % 01/24/25 1445   Vitals shown include unfiled device data.        Post Anesthesia Care and Evaluation    Patient location during evaluation: bedside  Patient participation: complete - patient participated  Level of consciousness: awake  Pain score: 0  Pain management: adequate    Airway patency: patent  Anesthetic complications: No anesthetic complications  PONV Status: controlled  Cardiovascular status: acceptable and stable  Respiratory status: acceptable  Hydration status: acceptable

## 2025-01-24 NOTE — INTERVAL H&P NOTE
H&P reviewed. The patient was examined and there are no changes to the H&P.  Risk and benefits explained to patient. Risk including bleeding, infection, pneumothorax, and even death can occur. Patient expressed understanding and would like to proceed.    Electronically signed by Annie Eid MD, 01/24/25, 12:02 PM EST.

## 2025-01-26 LAB
BACTERIA SPEC AEROBE CULT: NORMAL
BACTERIA SPEC RESP CULT: NORMAL
GRAM STN SPEC: NORMAL

## 2025-01-27 ENCOUNTER — TREATMENT (OUTPATIENT)
Dept: PHYSICAL THERAPY | Facility: CLINIC | Age: 60
End: 2025-01-27
Payer: MEDICARE

## 2025-01-27 DIAGNOSIS — Z87.81 HISTORY OF FRACTURE OF RIGHT ANKLE: ICD-10-CM

## 2025-01-27 DIAGNOSIS — M25.571 CHRONIC PAIN OF RIGHT ANKLE: Primary | ICD-10-CM

## 2025-01-27 DIAGNOSIS — G89.29 CHRONIC PAIN OF RIGHT ANKLE: Primary | ICD-10-CM

## 2025-01-27 DIAGNOSIS — R29.898 WEAKNESS OF FOOT, RIGHT: ICD-10-CM

## 2025-01-27 PROCEDURE — 97110 THERAPEUTIC EXERCISES: CPT

## 2025-01-27 PROCEDURE — 97112 NEUROMUSCULAR REEDUCATION: CPT

## 2025-01-27 PROCEDURE — 97164 PT RE-EVAL EST PLAN CARE: CPT

## 2025-01-27 NOTE — PROGRESS NOTES
Progress Note      Patient: Tami Parsons   : 1965  Diagnosis/ICD-10 Code:  Chronic pain of right ankle [M25.571, G89.29]  Referring practitioner: Binh Llanes MD  Date of Initial Visit: Type: THERAPY  Noted: 2024  Today's Date: 2025  Patient seen for 6 sessions      Subjective:   Subjective Questionnaire: LEFS: 52  Clinical Progress: improved  Home Program Compliance: Yes  Treatment has included: therapeutic exercise, neuromuscular re-education, manual therapy, therapeutic activity, and gait training    Subjective   Patient reports overall improvement, she still can have a twinge of pain when he ankle moves out.   Objective          Neurological Testing     Sensation     Ankle/Foot     Right Ankle/Foot   Paresthesia: light touch    Comments   Right light touch: lateral foot.     Active Range of Motion     Right Ankle/Foot   Dorsiflexion (ke): 6 degrees   Plantar flexion: 56 degrees   Inversion: 36 degrees   Eversion: 20 degrees     Joint Play     Right Ankle/Foot  Joints within functional limits are the midfoot and forefoot. Hypomobile in the talocrural joint and subtalar joint.     Strength/Myotome Testing     Right Ankle/Foot   Dorsiflexion: 4-  Plantar flexion: 5  Inversion: 5  Eversion: 4-  Great toe flexion: 5  Great toe extension: 5    Tests     Right Ankle/Foot   Negative for Tinel's sign (tarsal tunnel).     Ambulation     Observational Gait   Walking speed, stride length and right stance time within functional limits.     Functional Assessment     Single Leg Stance   Right: 3 seconds      See Exercise, Manual, and Modality Logs for complete treatment.     Assessment/Plan    Patient has progressed well since beginning skilled care. Significant improvement noted in both strength and ROM allowing for improved capacity to ambulate household distances and stair navigation. They are still limited in ankle stability, strength, decreased balance and by increased pain which limit their ability  to perform prolonged standing, navigate uneven ground, yard work, home care activities, and ADLs. Further skilled care indicated at this time to address remaining deficits.     Progress toward previous goals: Partially Met   1. The patient has limited ROM for the right ankle.   LTG 1: 12 weeks:  In order to allow the patient greater ease with forward, lateral, and diagonal mobility the patient will demonstrate improved ROM of the right ankle as follows:  10 degrees of dorsiflexion, 55 degrees of plantarflexion, 35 degrees of inversion, and 15 degrees of eversion.  STATUS:  progressing     STG 1a: 6 weeks:  The patient will demonstrate improved ROM of the right ankle as follows:  5 degrees of dorsiflexion, 50 degrees of plantarflexion, 30 degrees of inversion, and 10 degrees of eversion.    STATUS:  met    2. The patient has limited strength of the right ankle.   LTG 2: 12 weeks:  In order to provide greater joint stability of the right ankle the patient will demonstrate improved strength of the right ankle as follows:  5/5 dorsiflexion, 5/5 inversion, 5/5 eversion, and 5/5 plantar flexion.    STATUS:  progressing     STG 2a: 6 weeks:  The patient will demonstrate improved strength of the right ankle as follows:  4/5 dorsiflexion, 4/5 inversion, 4/5 eversion, and 4/5 plantar flexion.    STATUS:  progressing      3. The patient has gait dysfunction.   LTG 3: 12 weeks:  The patient will ambulate without assistive device, independently, for community distances with minimal limp to the right lower extremity in order to improve mobility and allow patient to perform activities such as grocery shopping with greater ease.    STATUS:  progressing     STG 3a: The patient will be independent in HEP.    STATUS:met    4. The patient complains of right ankle pain.  LTG 4: 12 weeks:  The patient will report a pain rating of 1/10 or better in order to improve  tolerance to activities of daily living and improve sleep  quality.  STATUS:  progressing    STG 4a: 6 weeks:  The patient will report a pain rating of 4/10 or better.  STATUS:  met    5. Mobility: Walking/Moving Around Functional Limitation     LTG 5: 12 weeks:  The patient will demonstrate improved function by achieving a score of 60 on the Lower Extremity Functional Scale.    STATUS:  progressing     STG 5a: 6 weeks:  The patient will demonstrate improved function by achieving a score of 50 on the Lower Extremity Functional Scale.      STATUS: met   Recommendations: Continue as planned  Timeframe: 1 month  Prognosis to achieve goals: good    PT Signature: Dejuan Muro PT    Electronically signed 2025    [place] License: PT - [id number]       Timed:  Manual Therapy:    0     mins  42990;  Therapeutic Exercise:    10     mins  33554;     Neuromuscular Ronnie:    15    mins  [id number];    Therapeutic Activity:     0     mins  56098;     Gait Trainin     mins  42499;     Aquatics                         0      mins  55242    Un-timed:  Mechanical Traction      0     mins  [id number]  Dry Needling     0     mins self-pay  Electrical Stimulation:    0     mins  28786 ( );  6 minutes re-eval [id number]  Timed Treatment:   25   mins   Total Treatment:     31   mins

## 2025-01-28 LAB
CYTO UR: NORMAL
CYTO UR: NORMAL
LAB AP CASE REPORT: NORMAL
LAB AP CASE REPORT: NORMAL
LAB AP CLINICAL INFORMATION: NORMAL
LAB AP CLINICAL INFORMATION: NORMAL
PATH REPORT.FINAL DX SPEC: NORMAL
PATH REPORT.FINAL DX SPEC: NORMAL
PATH REPORT.GROSS SPEC: NORMAL
PATH REPORT.GROSS SPEC: NORMAL
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
## 2025-01-29 ENCOUNTER — TREATMENT (OUTPATIENT)
Dept: PHYSICAL THERAPY | Facility: CLINIC | Age: 60
End: 2025-01-29
Payer: MEDICARE

## 2025-01-29 DIAGNOSIS — Z87.81 HISTORY OF FRACTURE OF RIGHT ANKLE: ICD-10-CM

## 2025-01-29 DIAGNOSIS — G89.29 CHRONIC PAIN OF RIGHT ANKLE: Primary | ICD-10-CM

## 2025-01-29 DIAGNOSIS — M25.571 CHRONIC PAIN OF RIGHT ANKLE: Primary | ICD-10-CM

## 2025-01-29 DIAGNOSIS — R29.898 WEAKNESS OF FOOT, RIGHT: ICD-10-CM

## 2025-01-29 LAB
CMV DNA SPEC QL NAA+PROBE: NEGATIVE
FUNGUS WND CULT: ABNORMAL
SPECIMEN SOURCE: NORMAL

## 2025-01-29 NOTE — PROGRESS NOTES
Physical Therapy Daily Treatment Note  [facility] [address] [place] [address]    Patient: Tami Parsons   : 1965  Referring practitioner: Binh Llanes MD  Date of Initial Visit: Type: THERAPY  Noted: 2024  Today's Date: 2025  Patient seen for 7 sessions           Subjective  Tami Parsons reports: she feels good. No c/o.    Objective   See Exercise, Manual, and Modality Logs for complete treatment.     Assessment/Plan  Refer to re-evaluation dated 25 for updated POC.    Visit Diagnoses:    ICD-10-CM ICD-9-CM   1. Chronic pain of right ankle  M25.571 719.47    G89.29 338.29   2. Weakness of foot, right  R29.898 734   3. History of fracture of right ankle  Z87.81 V15.51       Progress per Plan of Care and Progress strengthening /stabilization /functional activity       Timed:  Manual Therapy:         mins  73317;  Therapeutic Exercise:    15     mins  03352;     Neuromuscular Ronnie:    14    mins  44590;    Therapeutic Activity:          mins  48601;     Gait Training:           mins  74613;     Ultrasound:          mins  01521;    Electrical Stimulation:         mins  62735 ( );  Aquatics  __   mins   68860    Untimed:  Electrical Stimulation:         mins  97862 ( );  Mechanical Traction:         mins  88996;     Timed Treatment:   29   mins   Total Treatment:     29   mins    Electronically Signed:  Denise Carrion PTA  Physical Therapist Assistant    [place] PTA license [id number]

## 2025-01-31 ENCOUNTER — OFFICE VISIT (OUTPATIENT)
Dept: PULMONOLOGY | Facility: CLINIC | Age: 60
End: 2025-01-31
Payer: MEDICARE

## 2025-01-31 VITALS
RESPIRATION RATE: 18 BRPM | HEART RATE: 80 BPM | WEIGHT: 165.8 LBS | TEMPERATURE: 98.1 F | BODY MASS INDEX: 26.02 KG/M2 | OXYGEN SATURATION: 97 % | SYSTOLIC BLOOD PRESSURE: 134 MMHG | HEIGHT: 67 IN | DIASTOLIC BLOOD PRESSURE: 65 MMHG

## 2025-01-31 DIAGNOSIS — Z87.891 PERSONAL HISTORY OF NICOTINE DEPENDENCE: ICD-10-CM

## 2025-01-31 DIAGNOSIS — R93.89 ABNORMAL CHEST CT: ICD-10-CM

## 2025-01-31 DIAGNOSIS — Z71.6 ENCOUNTER FOR SMOKING CESSATION COUNSELING: ICD-10-CM

## 2025-01-31 DIAGNOSIS — A49.2 HAEMOPHILUS INFLUENZAE INFECTION: ICD-10-CM

## 2025-01-31 DIAGNOSIS — J39.8 TRACHEAL MASS: Primary | ICD-10-CM

## 2025-01-31 DIAGNOSIS — Z72.0 TOBACCO ABUSE: ICD-10-CM

## 2025-01-31 LAB
MYCOBACTERIUM SPEC CULT: NORMAL
NIGHT BLUE STAIN TISS: NORMAL
NIGHT BLUE STAIN TISS: NORMAL

## 2025-01-31 RX ORDER — CEFDINIR 300 MG/1
300 CAPSULE ORAL 2 TIMES DAILY
Qty: 10 CAPSULE | Refills: 0 | Status: SHIPPED | OUTPATIENT
Start: 2025-01-31 | End: 2025-02-05

## 2025-02-03 ENCOUNTER — TELEPHONE (OUTPATIENT)
Dept: PHYSICAL THERAPY | Facility: OTHER | Age: 60
End: 2025-02-03
Payer: MEDICARE

## 2025-02-03 LAB — VIRUS SPEC CULT: NORMAL

## 2025-02-03 NOTE — TELEPHONE ENCOUNTER
Caller: DULCE WRIGHT    Relationship: Emergency Contact    What was the call regarding: PATIENT CANCELLED APPT TODAY DUE TO NOT FEELING WELL

## 2025-02-04 ENCOUNTER — TELEPHONE (OUTPATIENT)
Dept: PULMONOLOGY | Facility: CLINIC | Age: 60
End: 2025-02-04
Payer: MEDICARE

## 2025-02-04 NOTE — TELEPHONE ENCOUNTER
Pharmacy Name:  [id number] - [place], [place] - [address] AT [facility] [address] - [phone]  - [phone] FX       Pharmacy representative phone number: [phone]/    What medication are you calling in regards to: KINA    What question does the pharmacy have: PT STATES THAT SHE IS ALLERGIC TO THIS, PHARMACY NEEDS TO IF SHE IS IN FACT AND DO YOU WANT TO SEND A DIFFERENT ANTIBIOTIC    Who is the provider that prescribed the medication: TARA TINSLEY    Additional notes:

## 2025-02-05 DIAGNOSIS — A49.2 HAEMOPHILUS INFLUENZAE INFECTION: Primary | ICD-10-CM

## 2025-02-05 RX ORDER — AZITHROMYCIN 500 MG/1
500 TABLET, FILM COATED ORAL DAILY
Qty: 7 TABLET | Refills: 0 | Status: SHIPPED | OUTPATIENT
Start: 2025-02-05 | End: 2025-02-12

## 2025-02-05 RX ORDER — FLUCONAZOLE 150 MG/1
150 TABLET ORAL ONCE
Qty: 1 TABLET | Refills: 0 | Status: SHIPPED | OUTPATIENT
Start: 2025-02-05 | End: 2025-02-05

## 2025-02-05 NOTE — TELEPHONE ENCOUNTER
Annie called and stated that they cannot prescribe cefdinir  because of her allergy. Please call Patient and/or Annie back. Thank You   No change

## 2025-02-07 LAB
MYCOBACTERIUM SPEC CULT: NORMAL
NIGHT BLUE STAIN TISS: NORMAL
NIGHT BLUE STAIN TISS: NORMAL

## 2025-02-10 ENCOUNTER — TELEPHONE (OUTPATIENT)
Dept: PULMONOLOGY | Facility: CLINIC | Age: 60
End: 2025-02-10

## 2025-02-10 DIAGNOSIS — R07.81 PLEURITIC PAIN: Primary | ICD-10-CM

## 2025-02-10 NOTE — TELEPHONE ENCOUNTER
Please let patient know that I will order her a chest x-ray to have done at her convenience.  We will follow-up with results, thank you.

## 2025-02-10 NOTE — TELEPHONE ENCOUNTER
Caller: DULCE WRIGHT     Relationship: SPOUSE    Best call back number: [phone] PLEASE LEAVE MESSAGE IF UNABLE TO ANSWER    What is your medical concern? PATIENT HAD A BRONCH DONE ON 1/24 WITH . HOWEVER SHE IS STILL HAVING CONSTANT PAIN IN HER LEFT LUNG ABOUT AN INCH OR SO BELOW HER BREAST AND WOULD LIKE TO SPEAK WITH SOMEONE/BE LOOKED AT.    How long has this issue been going on? IT NEVER WENT AWAY AFTER THE SURGERY.

## 2025-02-12 ENCOUNTER — TREATMENT (OUTPATIENT)
Dept: PHYSICAL THERAPY | Facility: CLINIC | Age: 60
End: 2025-02-12
Payer: MEDICARE

## 2025-02-12 DIAGNOSIS — M25.571 CHRONIC PAIN OF RIGHT ANKLE: Primary | ICD-10-CM

## 2025-02-12 DIAGNOSIS — G89.29 CHRONIC PAIN OF RIGHT ANKLE: Primary | ICD-10-CM

## 2025-02-12 DIAGNOSIS — R29.898 WEAKNESS OF FOOT, RIGHT: ICD-10-CM

## 2025-02-12 NOTE — PROGRESS NOTES
Physical Therapy Daily Treatment Note                          Patient: Tami Parsons   : 1965  Diagnosis/ICD-10 Code:  No primary diagnosis found.  Referring practitioner: Binh Llanes MD  Date of Initial Visit: No linked episodes  Today's Date: 2025  Patient seen for Visit count could not be calculated. Make sure you are using a visit which is associated with an episode. sessions           Subjective   The patient reported increased pain 10/10 with the weather change    Objective   See Exercise, Manual, and Modality Logs for complete treatment.     Assessment/Plan     Focused on pain relief with manual intervention. Advised patient on ice, elevation, NSAID use, and stretching for swelling control. Will continue POC as appropriate.        Timed:  Manual Therapy:    20     mins  89526;  Therapeutic Exercise:    10     mins  18130;     Neuromuscular Ronnie:   0    mins  23943;    Therapeutic Activity:     0     mins  71653;     Gait Trainin     mins  20866;     Aquatics                         0      mins  39019    Un-timed:  Mechanical Traction      0     mins  52452  Dry Needling     0     mins self-pay  Electrical Stimulation:    0     mins  09598 ( );      Timed Treatment:   30   mins   Total Treatment:     30   mins    Dejuan Muro PT  Physical Therapist    Electronically signed 2025    [place] License: PT - [id number]

## 2025-02-14 LAB
MYCOBACTERIUM SPEC CULT: NORMAL
NIGHT BLUE STAIN TISS: NORMAL
NIGHT BLUE STAIN TISS: NORMAL

## 2025-02-19 LAB — FUNGUS WND CULT: ABNORMAL

## 2025-02-21 LAB
MYCOBACTERIUM SPEC CULT: NORMAL
NIGHT BLUE STAIN TISS: NORMAL
NIGHT BLUE STAIN TISS: NORMAL

## 2025-02-28 LAB
MYCOBACTERIUM SPEC CULT: NORMAL
NIGHT BLUE STAIN TISS: NORMAL
NIGHT BLUE STAIN TISS: NORMAL

## 2025-03-04 ENCOUNTER — TREATMENT (OUTPATIENT)
Dept: PHYSICAL THERAPY | Facility: CLINIC | Age: 60
End: 2025-03-04
Payer: MEDICARE

## 2025-03-04 DIAGNOSIS — Z87.81 HISTORY OF FRACTURE OF RIGHT ANKLE: ICD-10-CM

## 2025-03-04 DIAGNOSIS — G89.29 CHRONIC PAIN OF RIGHT ANKLE: Primary | ICD-10-CM

## 2025-03-04 DIAGNOSIS — M25.571 CHRONIC PAIN OF RIGHT ANKLE: Primary | ICD-10-CM

## 2025-03-04 DIAGNOSIS — R29.898 WEAKNESS OF FOOT, RIGHT: ICD-10-CM

## 2025-03-04 NOTE — PROGRESS NOTES
Progress Note/Discharge      Patient: Tami Parsons   : 1965  Diagnosis/ICD-10 Code:  Chronic pain of right ankle [M25.571, G89.29]  Referring practitioner: Binh Llanes MD  Date of Initial Visit: Type: THERAPY  Noted: 2024  Today's Date: 3/4/2025  Patient seen for 9 sessions      Subjective:     Clinical Progress: improved  Home Program Compliance: Yes  Treatment has included: therapeutic exercise, neuromuscular re-education, manual therapy, therapeutic activity, and gait training    Subjective   Patient reports overall improvement, she still struggles with swelling when the weather changes  Objective          Neurological Testing     Sensation     Ankle/Foot     Right Ankle/Foot   Paresthesia: light touch    Comments   Right light touch: lateral foot.     Active Range of Motion     Right Ankle/Foot   Dorsiflexion (ke): 5 degrees   Plantar flexion: 60 degrees   Inversion: 33 degrees   Eversion: 26 degrees     Joint Play     Right Ankle/Foot  Joints within functional limits are the talocrural joint, subtalar joint, midfoot and forefoot.     Strength/Myotome Testing     Right Ankle/Foot   Dorsiflexion: 5  Plantar flexion: 5  Inversion: 5  Eversion: 5  Great toe flexion: 5  Great toe extension: 5    Tests     Right Ankle/Foot   Negative for Tinel's sign (tarsal tunnel).     Ambulation     Observational Gait   Walking speed, stride length and right stance time within functional limits.     Functional Assessment     Single Leg Stance   Right: 15 seconds      See Exercise, Manual, and Modality Logs for complete treatment.     Assessment/Plan    Patient has progressed well since beginning skilled care. She has met a plateau in progress at this time, educated patient on self management strategies with swelling. Discharging patient to home program.     Progress toward previous goals: Partially Met   1. The patient has limited ROM for the right ankle.   LTG 1: 12 weeks:  In order to allow the patient greater  ease with forward, lateral, and diagonal mobility the patient will demonstrate improved ROM of the right ankle as follows:  10 degrees of dorsiflexion, 55 degrees of plantarflexion, 35 degrees of inversion, and 15 degrees of eversion.  STATUS:  partially met     STG 1a: 6 weeks:  The patient will demonstrate improved ROM of the right ankle as follows:  5 degrees of dorsiflexion, 50 degrees of plantarflexion, 30 degrees of inversion, and 10 degrees of eversion.    STATUS:  met    2. The patient has limited strength of the right ankle.   LTG 2: 12 weeks:  In order to provide greater joint stability of the right ankle the patient will demonstrate improved strength of the right ankle as follows:  5/5 dorsiflexion, 5/5 inversion, 5/5 eversion, and 5/5 plantar flexion.    STATUS:  met   STG 2a: 6 weeks:  The patient will demonstrate improved strength of the right ankle as follows:  4/5 dorsiflexion, 4/5 inversion, 4/5 eversion, and 4/5 plantar flexion.    STATUS:  met      3. The patient has gait dysfunction.   LTG 3: 12 weeks:  The patient will ambulate without assistive device, independently, for community distances with minimal limp to the right lower extremity in order to improve mobility and allow patient to perform activities such as grocery shopping with greater ease.    STATUS:  met     STG 3a: The patient will be independent in HEP.    STATUS:met    4. The patient complains of right ankle pain.  LTG 4: 12 weeks:  The patient will report a pain rating of 1/10 or better in order to improve  tolerance to activities of daily living and improve sleep quality.  STATUS:  partially met    STG 4a: 6 weeks:  The patient will report a pain rating of 4/10 or better.  STATUS:  met    5. Mobility: Walking/Moving Around Functional Limitation     LTG 5: 12 weeks:  The patient will demonstrate improved function by achieving a score of 60 on the Lower Extremity Functional Scale.    STATUS:  partially met     STG 5a: 6 weeks:  The  patient will demonstrate improved function by achieving a score of 50 on the Lower Extremity Functional Scale.      STATUS: met   Recommendations: Discharge    Prognosis to achieve goals: good    PT Signature: Dejuan Muro, PT    Electronically signed 3/4/2025    [place] License: PT - [id number]       Timed:  Manual Therapy:    0     mins  76765;  Therapeutic Exercise:    0     mins  46718;     Neuromuscular Ronnie:   0    mins  26656;    Therapeutic Activity:     0     mins  44971;     Gait Trainin     mins  92102;     Aquatics                         0      mins  26147  10 minutes self care 83062  Un-timed:  Mechanical Traction      0     mins  02700  Dry Needling     0     mins self-pay  Electrical Stimulation:    0     mins  68399 ( );  6 minutes re-eval 88198  Timed Treatment:   10   mins   Total Treatment:     16   mins

## 2025-03-07 LAB
MYCOBACTERIUM SPEC CULT: NORMAL
NIGHT BLUE STAIN TISS: NORMAL
NIGHT BLUE STAIN TISS: NORMAL

## 2025-05-19 ENCOUNTER — OFFICE VISIT (OUTPATIENT)
Dept: FAMILY MEDICINE CLINIC | Facility: CLINIC | Age: 60
End: 2025-05-19
Payer: MEDICARE

## 2025-05-19 VITALS
BODY MASS INDEX: 26.84 KG/M2 | DIASTOLIC BLOOD PRESSURE: 76 MMHG | HEART RATE: 71 BPM | OXYGEN SATURATION: 100 % | SYSTOLIC BLOOD PRESSURE: 119 MMHG | HEIGHT: 67 IN | WEIGHT: 171 LBS

## 2025-05-19 DIAGNOSIS — M15.0 PRIMARY OSTEOARTHRITIS INVOLVING MULTIPLE JOINTS: Primary | ICD-10-CM

## 2025-05-19 DIAGNOSIS — Z01.30 BLOOD PRESSURE CHECK: ICD-10-CM

## 2025-05-19 DIAGNOSIS — Z12.11 COLON CANCER SCREENING: ICD-10-CM

## 2025-05-19 PROCEDURE — 99213 OFFICE O/P EST LOW 20 MIN: CPT | Performed by: STUDENT IN AN ORGANIZED HEALTH CARE EDUCATION/TRAINING PROGRAM

## 2025-05-19 RX ORDER — MULTIPLE VITAMINS W/ MINERALS TAB 9MG-400MCG
1 TAB ORAL DAILY
COMMUNITY

## 2025-05-19 RX ORDER — TIZANIDINE 2 MG/1
2 TABLET ORAL EVERY 8 HOURS PRN
Qty: 21 TABLET | Refills: 0 | Status: SHIPPED | OUTPATIENT
Start: 2025-05-19

## 2025-05-19 NOTE — PROGRESS NOTES
Subjective:       Tami Parsons is a 60 y.o. female with history of hyperlipidemia and current everyday smoking who presents for blood pressure recheck and arthritis.     Ms. Parsons had previously been diagnosed with hypertension but had employ lifestyle and dietary modifications.  Blood pressure is now improved to 119/76.  We will avoid using medications and we will de-escalate appointments to every year from every 6 months.    Ms. Parsons had previously been diagnosed with hyperlipidemia.  She declines treatment with statin.  Will recheck lipid panel at next physical in 3 months.  If still elevated, would again recommend statin to lower risk of heart attack and stroke given smoking history, which increases her risk overall compared to the general population.    Ms. Parsons has history of osteoarthritis of multiple joints.  She defers on refer imaging or physical therapy and would like as needed medications.  Would treat with as needed Voltaren and for when she has back pain as needed muscle relaxer.  Discussed side effects including GI intolerance with NSAID and fatigue and sleepiness with muscle relaxer and recommended she take NSAID with food and avoid taking muscle relaxer before driving or operating heavy machinery.    She is now followed by pulmonology in terms of lung cancer CT screening.    [facility].    She previously had elevated creatinine.  I had ordered a recheck but she has not obtained it yet.  Reminded her to obtain this at her earliest convenience.    Past Medical Hx:  Past Medical History:   Diagnosis Date    Asthma        Past Surgical Hx:  Past Surgical History:   Procedure Laterality Date    BRONCHOSCOPY N/A 1/24/2025    Procedure: BRONCHOSCOPY WITH BAL AND BIOPSIES;  Surgeon: Annie Eid MD;  Location: [facility] ENDOSCOPY;  Service: Pulmonary;  Laterality: N/A;  ENDOBRONCHEAL LESION    HYSTERECTOMY         Current  "Meds:    Current Outpatient Medications:     multivitamin with minerals tablet tablet, Take 1 tablet by mouth Daily., Disp: , Rfl:     vitamin D3 125 MCG (5000 UT) capsule capsule, Take 1 capsule by mouth Daily., Disp: , Rfl:     Zinc Acetate, Oral, (ZINC ACETATE PO), Take  by mouth., Disp: , Rfl:     diclofenac (VOLTAREN) 50 MG EC tablet, Take 1 tablet by mouth 2 (Two) Times a Day As Needed (osteoarthritis)., Disp: 60 tablet, Rfl: 1    tiZANidine (ZANAFLEX) 2 MG tablet, Take 1 tablet by mouth Every 8 (Eight) Hours As Needed for Muscle Spasms., Disp: 21 tablet, Rfl: 0    Allergies:  Allergies   Allergen Reactions    Keflex [Cephalexin] Hives    Omnicef [Cefdinir] Hives    Penicillins Unknown - Low Severity       Family Hx:  Family History   Problem Relation Age of Onset    Cancer Father     Asthma Paternal Grandmother         Social History:  Social History     Socioeconomic History    Marital status:    Tobacco Use    Smoking status: Every Day     Current packs/day: 0.25     Average packs/day: 0.5 packs/day for 43.4 years (21.3 ttl pk-yrs)     Types: Cigarettes     Start date: 1982    Smokeless tobacco: Never   Vaping Use    Vaping status: Never Used   Substance and Sexual Activity    Alcohol use: Yes     Comment: socially    Drug use: Never    Sexual activity: Defer       Review of Systems  Review of Systems   Musculoskeletal:  Positive for arthralgias and back pain.       Objective:     /76 (BP Location: Right arm, Patient Position: Sitting, Cuff Size: Large Adult)   Pulse 71   Ht 170.2 cm (67\")   Wt 77.6 kg (171 lb)   LMP  (LMP Unknown)   SpO2 100%   BMI 26.78 kg/m²   Physical Exam  Constitutional:       General: She is not in acute distress.     Appearance: Normal appearance. She is not ill-appearing, toxic-appearing or diaphoretic.   Pulmonary:      Effort: Pulmonary effort is normal. No respiratory distress.   Neurological:      Mental Status: She is alert.   Psychiatric:         Mood and " Affect: Mood normal.         Behavior: Behavior normal.          Assessment/Plan:     Diagnoses and all orders for this visit:    1. Primary osteoarthritis involving multiple joints (Primary)    Ms. Parsons has history of osteoarthritis of multiple joints.  She defers on refer imaging or physical therapy and would like as needed medications.  Would treat with as needed Voltaren and for when she has back pain as needed muscle relaxer.  Discussed side effects including GI intolerance with NSAID and fatigue and sleepiness with muscle relaxer and recommended she take NSAID with food and avoid taking muscle relaxer before driving or operating heavy machinery.     -     diclofenac (VOLTAREN) 50 MG EC tablet; Take 1 tablet by mouth 2 (Two) Times a Day As Needed (osteoarthritis).  Dispense: 60 tablet; Refill: 1  -     tiZANidine (ZANAFLEX) 2 MG tablet; Take 1 tablet by mouth Every 8 (Eight) Hours As Needed for Muscle Spasms.  Dispense: 21 tablet; Refill: 0    2. Colon cancer screening  -     Cologuard - Stool, Per Rectum; Future    3. Blood pressure check    Ms. Parsons had previously been diagnosed with hypertension but had employ lifestyle and dietary modifications.  Blood pressure is now improved to 119/76.  We will avoid using medications and we will de-escalate appointments to every year from every 6 months.            Follow-up:     Return in about 6 months (around 11/19/2025) for Annual physical.    Preventative:  Health Maintenance   Topic Date Due    Pneumococcal Vaccine 50+ (1 of 2 - PCV) Never done    COLORECTAL CANCER SCREENING  Never done    ZOSTER VACCINE (1 of 2) Never done    ANNUAL WELLNESS VISIT  Never done    COVID-19 Vaccine (4 - 2024-25 season) 09/01/2024    TDAP/TD VACCINES (1 - Tdap) 10/07/2025 (Originally 3/28/1984)    INFLUENZA VACCINE  07/01/2025    LUNG CANCER SCREENING  12/10/2025    MAMMOGRAM  12/10/2026    HEPATITIS C SCREENING  Completed         This document has been electronically signed by Binh  ANDREA Llanes MD on May 19, 2025 14:29 EDT       Parts of this note are electronic transcriptions/translations of spoken language to printed text using the Dragon Dictation system.

## 2025-05-29 ENCOUNTER — TELEPHONE (OUTPATIENT)
Dept: FAMILY MEDICINE CLINIC | Facility: CLINIC | Age: 60
End: 2025-05-29

## 2025-05-29 NOTE — TELEPHONE ENCOUNTER
Caller: Tami Parsons    Relationship: Self    Best call back number: [phone]    What medication are you requesting: AMOXICILLIN     What are your current symptoms: COUGH    How long have you been experiencing symptoms: 4 DAYS     If a prescription is needed, what is your preferred pharmacy and phone number: [id number] - [place] - [address] AT [facility] [address] - [phone]  - [phone] FX     Additional notes:

## 2025-05-30 ENCOUNTER — OFFICE VISIT (OUTPATIENT)
Dept: FAMILY MEDICINE CLINIC | Facility: CLINIC | Age: 60
End: 2025-05-30
Payer: MEDICARE

## 2025-05-30 VITALS
BODY MASS INDEX: 26.87 KG/M2 | SYSTOLIC BLOOD PRESSURE: 169 MMHG | HEIGHT: 67 IN | HEART RATE: 73 BPM | WEIGHT: 171.2 LBS | DIASTOLIC BLOOD PRESSURE: 107 MMHG | OXYGEN SATURATION: 100 %

## 2025-05-30 DIAGNOSIS — J22 LOWER RESPIRATORY INFECTION (E.G., BRONCHITIS, PNEUMONIA, PNEUMONITIS, PULMONITIS): ICD-10-CM

## 2025-05-30 DIAGNOSIS — R05.1 ACUTE COUGH: Primary | ICD-10-CM

## 2025-05-30 LAB
EXPIRATION DATE: NORMAL
FLUAV AG UPPER RESP QL IA.RAPID: NOT DETECTED
FLUBV AG UPPER RESP QL IA.RAPID: NOT DETECTED
INTERNAL CONTROL: NORMAL
Lab: NORMAL
SARS-COV-2 AG UPPER RESP QL IA.RAPID: NOT DETECTED

## 2025-05-30 PROCEDURE — 1159F MED LIST DOCD IN RCRD: CPT

## 2025-05-30 PROCEDURE — 1160F RVW MEDS BY RX/DR IN RCRD: CPT

## 2025-05-30 PROCEDURE — 99213 OFFICE O/P EST LOW 20 MIN: CPT

## 2025-05-30 PROCEDURE — 87428 SARSCOV & INF VIR A&B AG IA: CPT

## 2025-05-30 RX ORDER — GUAIFENESIN AND DEXTROMETHORPHAN HYDROBROMIDE 600; 30 MG/1; MG/1
2 TABLET, EXTENDED RELEASE ORAL 2 TIMES DAILY
Qty: 20 TABLET | Refills: 0 | Status: SHIPPED | OUTPATIENT
Start: 2025-05-30 | End: 2025-06-04

## 2025-05-30 RX ORDER — AZITHROMYCIN 250 MG/1
TABLET, FILM COATED ORAL
Qty: 6 TABLET | Refills: 0 | Status: SHIPPED | OUTPATIENT
Start: 2025-05-30

## 2025-05-30 RX ORDER — AMOXICILLIN 500 MG/1
CAPSULE ORAL 2 TIMES DAILY
COMMUNITY

## 2025-05-30 NOTE — PROGRESS NOTES
Chief Complaint  Cough    SUBJECTIVE  Tami Parsons presents to [facility]    History of Present Illness  Patient is 60-year-old female who presents today for cough and chest congestion for 3 days. She had runny nose initially but has since resolved.  She said she's been taking left over amoxicillin her dentist gave her and is now out. Pt denies any fevers but has felt weak and overall not well. She has taken OTC tylenol severe cold and cough with no relief. BP is  noted to be elevated, likely due to the cold medication. She was treated for H.Influenzae pneumonia ain February. She has been in and out of the hospital taking care of her dad so possible exposure to illness. She is a smoker and has hx of childhood asthma. She has not had any SOA or wheezing.        Past Medical History:   Diagnosis Date    Asthma       Family History   Problem Relation Age of Onset    Cancer Father     Asthma Paternal Grandmother       Past Surgical History:   Procedure Laterality Date    BRONCHOSCOPY N/A 1/24/2025    Procedure: BRONCHOSCOPY WITH BAL AND BIOPSIES;  Surgeon: Annie Eid MD;  Location: [facility] ENDOSCOPY;  Service: Pulmonary;  Laterality: N/A;  ENDOBRONCHEAL LESION    HYSTERECTOMY          Current Outpatient Medications:     amoxicillin (AMOXIL) 500 MG capsule, Take  by mouth 2 (Two) Times a Day. Pt taking leftover medication, Disp: , Rfl:     multivitamin with minerals tablet tablet, Take 1 tablet by mouth Daily., Disp: , Rfl:     tiZANidine (ZANAFLEX) 2 MG tablet, Take 1 tablet by mouth Every 8 (Eight) Hours As Needed for Muscle Spasms., Disp: 21 tablet, Rfl: 0    vitamin D3 125 MCG (5000 UT) capsule capsule, Take 1 capsule by mouth Daily., Disp: , Rfl:     Zinc Acetate, Oral, (ZINC ACETATE PO), Take  by mouth., Disp: , Rfl:     azithromycin (Zithromax Z-Fransico) 250 MG tablet, Take 2 tablets by mouth on day 1, then 1 tablet daily on days 2-5, Disp: 6 tablet, Rfl: 0    diclofenac (VOLTAREN)  "50 MG EC tablet, Take 1 tablet by mouth 2 (Two) Times a Day As Needed (osteoarthritis). (Patient not taking: Reported on 5/30/2025), Disp: 60 tablet, Rfl: 1    guaifenesin-dextromethorphan 600-30 mg (MUCINEX DM)  MG tablet sustained-release 12 hour, Take 2 tablets by mouth 2 (Two) Times a Day for 5 days., Disp: 20 tablet, Rfl: 0    OBJECTIVE  Vital Signs:   BP (!) 169/107 (BP Location: Left arm, Patient Position: Sitting, Cuff Size: Large Adult)   Pulse 73   Ht 170.2 cm (67.01\")   Wt 77.7 kg (171 lb 3.2 oz)   LMP  (LMP Unknown)   SpO2 100%   BMI 26.81 kg/m²    Estimated body mass index is 26.81 kg/m² as calculated from the following:    Height as of this encounter: 170.2 cm (67.01\").    Weight as of this encounter: 77.7 kg (171 lb 3.2 oz).     Wt Readings from Last 3 Encounters:   05/30/25 77.7 kg (171 lb 3.2 oz)   05/19/25 77.6 kg (171 lb)   01/31/25 75.2 kg (165 lb 12.8 oz)     BP Readings from Last 3 Encounters:   05/30/25 (!) 169/107   05/19/25 119/76   01/31/25 134/65       Physical Exam  Vitals reviewed.   Constitutional:       General: She is not in acute distress.  HENT:      Head: Normocephalic and atraumatic.   Eyes:      Conjunctiva/sclera: Conjunctivae normal.   Cardiovascular:      Rate and Rhythm: Normal rate and regular rhythm.      Heart sounds: Normal heart sounds.   Pulmonary:      Effort: Pulmonary effort is normal.      Breath sounds: Wheezing and rhonchi present.   Skin:     General: Skin is warm.   Neurological:      Mental Status: She is alert and oriented to person, place, and time.   Psychiatric:         Mood and Affect: Mood normal.         Behavior: Behavior normal.         Thought Content: Thought content normal.         Judgment: Judgment normal.          Result Review    Common labs          11/6/2024    16:48   Common Labs   Glucose 83    BUN 12    Creatinine 1.05    Sodium 143    Potassium 4.4    Chloride 105    Calcium 9.5    Albumin 4.1    Total Bilirubin <0.2  "   Alkaline Phosphatase 47    AST (SGOT) 24    ALT (SGPT) 20    WBC 6.25    Hemoglobin 12.3    Hematocrit 39.0    Platelets 250    Total Cholesterol 178    Triglycerides 162    HDL Cholesterol 32    LDL Cholesterol  117        No Images in the past 120 days found..      The above data has been reviewed by JEFFY Akers 05/30/2025 09:51 EDT.          Patient Care Team:  Binh Llanes MD as PCP - General (Family Medicine)  Kylah Swartz APRN as Nurse Practitioner (Pulmonary Disease)            ASSESSMENT & PLAN    Diagnoses and all orders for this visit:    1. Acute cough (Primary)  -     POCT SARS-CoV-2 Antigen JEFFREY + Flu  -     guaifenesin-dextromethorphan 600-30 mg (MUCINEX DM)  MG tablet sustained-release 12 hour; Take 2 tablets by mouth 2 (Two) Times a Day for 5 days.  Dispense: 20 tablet; Refill: 0    2. Lower respiratory infection (e.g., bronchitis, pneumonia, pneumonitis, pulmonitis)  -     azithromycin (Zithromax Z-Fransico) 250 MG tablet; Take 2 tablets by mouth on day 1, then 1 tablet daily on days 2-5  Dispense: 6 tablet; Refill: 0  -     guaifenesin-dextromethorphan 600-30 mg (MUCINEX DM)  MG tablet sustained-release 12 hour; Take 2 tablets by mouth 2 (Two) Times a Day for 5 days.  Dispense: 20 tablet; Refill: 0    Rapid COVID/FLU swab in office was negative. Due to her presenting symptoms, exam and recent hx of pneumonia and her increased risk for developing complications of pneumonia due to her smoking, will initiate z-fransico for antimicrobial coverage and start Mucinex-DM. Will withhold steroids due to HTN and no symptomatic SOA, O2 sats stable.  Avoid OTC cold/cough medications. If symptoms persist or worsen please call back for chest xray.    Tobacco Use: High Risk (5/30/2025)    Patient History     Smoking Tobacco Use: Every Day     Smokeless Tobacco Use: Never     Passive Exposure: Not on file       Follow Up     Return if symptoms worsen or fail to improve.      Patient was given  instructions and counseling regarding her condition or for health maintenance advice. Please see specific information pulled into the AVS if appropriate.   I have reviewed information obtained and documented by others and I have confirmed the accuracy of this documented note.    JEFFY Akers

## 2025-06-10 ENCOUNTER — TELEPHONE (OUTPATIENT)
Dept: FAMILY MEDICINE CLINIC | Facility: CLINIC | Age: 60
End: 2025-06-10
Payer: MEDICARE

## 2025-06-10 NOTE — TELEPHONE ENCOUNTER
Patient saw Zhanna at the end of May for an ongoing cough.  She has had no relief and would like something to be prescribed for the cough. Patient is unable to come in for an appointment.

## 2025-06-10 NOTE — TELEPHONE ENCOUNTER
Left v/m for patient regarding need to make an appointment for evaluation to get medication to treat ongoing cough.

## 2025-06-10 NOTE — TELEPHONE ENCOUNTER
If she is not able to come in for an appointment today, would recommend allowing me to order a chest x-ray to ensure that the pneumonia she was treated for in February has fully resolved and she has not had recurrence of this.    Thank you,    Binh Llanes      This document has been electronically signed by Binh Llanes MD on Denita 10, 2025 11:30 EDT

## 2025-06-10 NOTE — TELEPHONE ENCOUNTER
Would recommend appointment for physical exam.       This document has been electronically signed by Binh Llanes MD on Denita 10, 2025 16:27 EDT

## 2025-07-21 ENCOUNTER — TELEPHONE (OUTPATIENT)
Dept: FAMILY MEDICINE CLINIC | Facility: CLINIC | Age: 60
End: 2025-07-21
Payer: MEDICARE

## 2025-07-21 NOTE — TELEPHONE ENCOUNTER
HUB TO SCHEDULE & RELAY:    Called patient to reschedule appt on 11/20/2025 due to provider leaving the office. Offered to establish with another provider. LVM

## (undated) DEVICE — Device

## (undated) DEVICE — SOLIDIFIER LIQLOC PLS 1500CC BT

## (undated) DEVICE — CONTAINER,SPEC,PNEUM TUBE,4OZ,STRL PATH: Brand: MEDLINE

## (undated) DEVICE — DEV ATOMIZATION MUCOSAL/NASALTRACH

## (undated) DEVICE — LINER SURG CANSTR SXN S/RIGD 1500CC

## (undated) DEVICE — THE STERILE LIGHT HANDLE COVER IS USED WITH STERIS SURGICAL LIGHTING AND VISUALIZATION SYSTEMS.

## (undated) DEVICE — SINGLE USE SUCTION VALVE MAJ-209: Brand: SINGLE USE SUCTION VALVE (STERILE)

## (undated) DEVICE — SINGLE USE BIOPSY VALVE MAJ-210: Brand: SINGLE USE BIOPSY VALVE (STERILE)

## (undated) DEVICE — FRCP BIOP ALLGTR/CUP 5MM 115CM DISP

## (undated) DEVICE — BLCK/BITE BLOX WO/DENTL/RIM W/STRAP 54F